# Patient Record
Sex: MALE | Race: WHITE | URBAN - METROPOLITAN AREA
[De-identification: names, ages, dates, MRNs, and addresses within clinical notes are randomized per-mention and may not be internally consistent; named-entity substitution may affect disease eponyms.]

---

## 2017-01-28 ENCOUNTER — OFFICE VISIT (OUTPATIENT)
Dept: URGENT CARE | Facility: RETAIL CLINIC | Age: 57
End: 2017-01-28
Payer: COMMERCIAL

## 2017-01-28 VITALS — TEMPERATURE: 98.1 F | HEART RATE: 67 BPM | SYSTOLIC BLOOD PRESSURE: 123 MMHG | DIASTOLIC BLOOD PRESSURE: 77 MMHG

## 2017-01-28 DIAGNOSIS — J01.90 ACUTE SINUSITIS WITH SYMPTOMS > 10 DAYS: Primary | ICD-10-CM

## 2017-01-28 PROCEDURE — 99213 OFFICE O/P EST LOW 20 MIN: CPT | Performed by: PHYSICIAN ASSISTANT

## 2017-01-28 RX ORDER — PREDNISONE 20 MG/1
40 TABLET ORAL DAILY
Qty: 10 TABLET | Refills: 0 | Status: SHIPPED | OUTPATIENT
Start: 2017-01-28 | End: 2017-02-02

## 2017-01-28 NOTE — MR AVS SNAPSHOT
"              After Visit Summary   1/28/2017    Chriss Valenzuela    MRN: [id number]           Patient Information     Date Of Birth          1960        Visit Information        Provider Department      1/28/2017 9:00 AM Reina Owusu PA-C [facility]        Today's Diagnoses     Acute sinusitis with symptoms > 10 days    -  1       Care Instructions    Augmentin 875 twice daily for 10 days.  Prednisone 40mg daily for 5 days in the morning with food.  Flonase 2 sprays in each nostril daily- may do 1 spray in AM and 1 spray in PM  Afrin (oxymetazoline) nasal spray twice daily for 3 days. Stop after 3 days.  Sudafed behind the pharmacist counter for 3-5 days helps relieve congestion  Take Tylenol or an NSAID such as ibuprofen or naproxen as needed for pain.  May use netti pot with bottled or distilled water and saline packets to flush sinuses.  Sit in the bathroom with the door closed and hot shower running to loosen mucus.  Contact primary care clinic if you do not have any relief from your symptoms after 10 days.  Present to emergency room for significantly increasing pain, persistent high fever >102F, swelling/redness around your eyes, changes in your vision or ability to move your eyes, altered mental status or a severe headache.    Call [facility] to set up an appointment with ENT:  Dr. Casanova        Follow-ups after your visit        Who to contact     You can reach your care team any time of the day by calling [phone].  Notification of test results:  If you have an abnormal lab result, we will notify you by phone as soon as possible.         Additional Information About Your Visit        [employer] Information     [employer] lets you send messages to your doctor, view your test results, renew your prescriptions, schedule appointments and more. To sign up, go to [email or web address] . Click on \"Log in\" on the left side of the screen, which will take you to the Welcome " "page. Then click on \"Sign up Now\" on the right side of the page.     You will be asked to enter the access code listed below, as well as some personal information. Please follow the directions to create your username and password.     Your access code is: [email or web address]  Expires: 2017  9:22 AM     Your access code will  in 90 days. If you need help or a new code, please call your [facility] clinic or [phone].        Care EveryWhere ID     This is your Care EveryWhere ID. This could be used by other organizations to access your [facility] medical records  [id number]        Your Vitals Were     Pulse Temperature                67 98.1  F (36.7  C) (Oral)           Blood Pressure from Last 3 Encounters:   17 123/77   16 114/70   01/04/15 116/78    Weight from Last 3 Encounters:   13 209 lb (94.802 kg)   11 217 lb (98.431 kg)              Today, you had the following     No orders found for display         Today's Medication Changes          These changes are accurate as of: 17  9:22 AM.  If you have any questions, ask your nurse or doctor.               Start taking these medicines.        Dose/Directions    amoxicillin-clavulanate 875-125 MG per tablet   Commonly known as:  AUGMENTIN   Used for:  Acute sinusitis with symptoms > 10 days        Dose:  1 tablet   Take 1 tablet by mouth 2 times daily for 10 days   Quantity:  20 tablet   Refills:  0       predniSONE 20 MG tablet   Commonly known as:  DELTASONE   Used for:  Acute sinusitis with symptoms > 10 days        Dose:  40 mg   Take 2 tablets (40 mg) by mouth daily for 5 days   Quantity:  10 tablet   Refills:  0            Where to get your medicines      These medications were sent to [id number] - [place], [place] - [address] [address] [facility]  [address] [facility] [address] [facility] [address]     Phone:  [phone]    - amoxicillin-clavulanate 875-125 MG per tablet  - predniSONE 20 MG tablet             Primary Care Provider Office Phone " # Fax #    Emerson Fatima [phone]       [address] [address]        Thank you!     Thank you for choosing [facility]  for your care. Our goal is always to provide you with excellent care. Hearing back from our patients is one way we can continue to improve our services. Please take a few minutes to complete the written survey that you may receive in the mail after your visit with us. Thank you!             Your Updated Medication List - Protect others around you: Learn how to safely use, store and throw away your medicines at www.disposemymeds.org.          This list is accurate as of: 1/28/17  9:22 AM.  Always use your most recent med list.                   Brand Name Dispense Instructions for use    amoxicillin-clavulanate 875-125 MG per tablet    AUGMENTIN    20 tablet    Take 1 tablet by mouth 2 times daily for 10 days       fluticasone 50 MCG/ACT spray    FLONASE    1 Package    1-2 sprays by Both Nostrils route daily.       IBUPROFEN PO          levothyroxine 25 MCG tablet    SYNTHROID/LEVOTHROID     Take 25 mcg by mouth daily.       MUCINEX D PO      Take 1 tablet by mouth daily.       predniSONE 20 MG tablet    DELTASONE    10 tablet    Take 2 tablets (40 mg) by mouth daily for 5 days       SUDAFED PO

## 2017-01-28 NOTE — PATIENT INSTRUCTIONS
Augmentin 875 twice daily for 10 days.  Prednisone 40mg daily for 5 days in the morning with food.  Flonase 2 sprays in each nostril daily- may do 1 spray in AM and 1 spray in PM  Afrin (oxymetazoline) nasal spray twice daily for 3 days. Stop after 3 days.  Sudafed behind the pharmacist counter for 3-5 days helps relieve congestion  Take Tylenol or an NSAID such as ibuprofen or naproxen as needed for pain.  May use netti pot with bottled or distilled water and saline packets to flush sinuses.  Sit in the bathroom with the door closed and hot shower running to loosen mucus.  Contact primary care clinic if you do not have any relief from your symptoms after 10 days.  Present to emergency room for significantly increasing pain, persistent high fever >102F, swelling/redness around your eyes, changes in your vision or ability to move your eyes, altered mental status or a severe headache.    Call [facility] to set up an appointment with ENT:  Dr. Casanova

## 2017-01-28 NOTE — PROGRESS NOTES
Chief Complaint   Patient presents with     Sinus Problem     x 3 months, no fevers, sinus pressure and pain, green mucus, ears feel plugged     SUBJECTIVE:  Chriss Valenzuela is a 57 year old male here with concerns about sinus infection.  He states onset of symptoms was 3 months ago. He is a  and thinks saw dust irritates his sinuses.  Course of illness is worsening in the last 7-10 days.   Severity moderate  He has had maxillary pressure as well as nasal congestion, facial pain/pressure, headache and ear fullness.  Predisposing factors include none.   Recent treatment has included: Decongestants and Steroid nasal spray. Advil 4 hours ago    Past Medical History   Diagnosis Date     Hypothyroid      GERD (gastroesophageal reflux disease)      Current Outpatient Prescriptions   Medication Sig Dispense Refill     Pseudoephedrine HCl (SUDAFED PO)        IBUPROFEN PO        amoxicillin-clavulanate (AUGMENTIN) 875-125 MG per tablet Take 1 tablet by mouth 2 times daily for 10 days 20 tablet 0     predniSONE (DELTASONE) 20 MG tablet Take 2 tablets (40 mg) by mouth daily for 5 days 10 tablet 0     fluticasone (FLONASE) 50 MCG/ACT nasal spray 1-2 sprays by Both Nostrils route daily. 1 Package 1     Pseudoephedrine-Guaifenesin (MUCINEX D PO) Take 1 tablet by mouth daily.       levothyroxine (SYNTHROID, LEVOTHROID) 25 MCG tablet Take 25 mcg by mouth daily.       Social History   Substance Use Topics     Smoking status: Never Smoker      Smokeless tobacco: Never Used     Alcohol Use: Not on file     No Known Allergies     ROS:  Review of systems negative except as stated above.    OBJECTIVE:  /77 mmHg  Pulse 67  Temp(Src) 98.1  F (36.7  C) (Oral)  GENERAL APPEARANCE: healthy, alert and no distress  EYES: PERRL, conjunctiva clear  HENT: Pain with palpation over frontal and maxillary sinuses. Ear canals normal TMs with mild serous effusions bilaterally. Nasal turbinates edematous and boggy with a blue hue  bilaterally. Posterior pharynx is not erythematous.  NECK: supple, nontender, no lymphadenopathy  RESP: lungs clear to auscultation - no rales, rhonchi or wheezes  CV: regular rates and rhythm, normal S1 S2, no murmur noted    ASSESSMENT:    ICD-10-CM    1. Acute sinusitis with symptoms > 10 days J01.90 amoxicillin-clavulanate (AUGMENTIN) 875-125 MG per tablet     predniSONE (DELTASONE) 20 MG tablet     PLAN:   Patient Instructions   Augmentin 875 twice daily for 10 days.  Prednisone 40mg daily for 5 days in the morning with food.  Flonase 2 sprays in each nostril daily- may do 1 spray in AM and 1 spray in PM  Afrin (oxymetazoline) nasal spray twice daily for 3 days. Stop after 3 days.  Sudafed behind the pharmacist counter for 3-5 days helps relieve congestion  Take Tylenol or an NSAID such as ibuprofen or naproxen as needed for pain.  May use netti pot with bottled or distilled water and saline packets to flush sinuses.  Sit in the bathroom with the door closed and hot shower running to loosen mucus.  Contact primary care clinic if you do not have any relief from your symptoms after 10 days.  Present to emergency room for significantly increasing pain, persistent high fever >102F, swelling/redness around your eyes, changes in your vision or ability to move your eyes, altered mental status or a severe headache.    Call [facility] to set up an appointment with ENT:  Dr. Casanova    Follow up with primary care provider with any problems, questions or concerns or if symptoms worsen or fail to improve. Patient agreed to plan and verbalized understanding.    Deneen Owusu PA-C  [facility]

## 2018-07-06 ENCOUNTER — OFFICE VISIT (OUTPATIENT)
Dept: URGENT CARE | Facility: RETAIL CLINIC | Age: 58
End: 2018-07-06
Payer: COMMERCIAL

## 2018-07-06 VITALS
DIASTOLIC BLOOD PRESSURE: 79 MMHG | OXYGEN SATURATION: 98 % | HEART RATE: 63 BPM | TEMPERATURE: 98 F | SYSTOLIC BLOOD PRESSURE: 125 MMHG

## 2018-07-06 DIAGNOSIS — J01.90 ACUTE SINUSITIS WITH SYMPTOMS > 10 DAYS: Primary | ICD-10-CM

## 2018-07-06 PROCEDURE — 99213 OFFICE O/P EST LOW 20 MIN: CPT | Performed by: FAMILY MEDICINE

## 2018-07-06 RX ORDER — CEPHALEXIN 500 MG/1
500 CAPSULE ORAL 3 TIMES DAILY
Qty: 30 CAPSULE | Refills: 0 | Status: SHIPPED | OUTPATIENT
Start: 2018-07-06

## 2018-07-06 NOTE — MR AVS SNAPSHOT
"              After Visit Summary   2018    Chriss Valenzuela    MRN: [id number]           Patient Information     Date Of Birth          1960        Visit Information        Provider Department      2018 4:00 PM Anjum Farrell MD [facility]        Today's Diagnoses     Acute sinusitis with symptoms > 10 days    -  1       Follow-ups after your visit        Who to contact     You can reach your care team any time of the day by calling [phone].  Notification of test results:  If you have an abnormal lab result, we will notify you by phone as soon as possible.         Additional Information About Your Visit        MyChart Information     [employer] lets you send messages to your doctor, view your test results, renew your prescriptions, schedule appointments and more. To sign up, go to [email or web address] . Click on \"Log in\" on the left side of the screen, which will take you to the Welcome page. Then click on \"Sign up Now\" on the right side of the page.     You will be asked to enter the access code listed below, as well as some personal information. Please follow the directions to create your username and password.     Your access code is: [email or web address]  Expires: 10/4/2018  4:23 PM     Your access code will  in 90 days. If you need help or a new code, please call your [facility] clinic or [phone].        Care EveryWhere ID     This is your Care EveryWhere ID. This could be used by other organizations to access your [facility] medical records  [id number]        Your Vitals Were     Pulse Temperature Pulse Oximetry             63 98  F (36.7  C) (Temporal) 98%          Blood Pressure from Last 3 Encounters:   18 125/79   17 123/77   16 114/70    Weight from Last 3 Encounters:   13 209 lb (94.8 kg)   11 217 lb (98.4 kg)              Today, you had the following     No orders found for display         Today's Medication Changes          These " changes are accurate as of 7/6/18  4:23 PM.  If you have any questions, ask your nurse or doctor.               Start taking these medicines.        Dose/Directions    cephALEXin 500 MG capsule   Commonly known as:  KEFLEX   Used for:  Acute sinusitis with symptoms > 10 days   Started by:  Anjum Farrell MD        Dose:  500 mg   Take 1 capsule (500 mg) by mouth 3 times daily   Quantity:  30 capsule   Refills:  0            Where to get your medicines      These medications were sent to [id number] - [place], [place] - [address]  [address], [facility] [address]     Phone:  [phone]     cephALEXin 500 MG capsule                Primary Care Provider Office Phone # Fax #    Emerson Fatima [phone]       [address] [address]        Equal Access to Services     [facility]: Hadii gordon chau hadasho Soomaali, waaxda luqadaha, qaybta kaalmada adeegyada, ramez boss . So [facility] [phone].    ATENCIÓN: Si habla español, tiene a beckwith disposición servicios gratuitos de asistencia lingüística. [facility] [phone].    We comply with applicable federal civil rights laws and [place] laws. We do not discriminate on the basis of race, color, national origin, age, disability, sex, sexual orientation, or gender identity.            Thank you!     Thank you for choosing [facility]  for your care. Our goal is always to provide you with excellent care. Hearing back from our patients is one way we can continue to improve our services. Please take a few minutes to complete the written survey that you may receive in the mail after your visit with us. Thank you!             Your Updated Medication List - Protect others around you: Learn how to safely use, store and throw away your medicines at www.disposemymeds.org.          This list is accurate as of 7/6/18  4:23 PM.  Always use your most recent med list.                    Brand Name Dispense Instructions for use Diagnosis    cephALEXin 500 MG capsule    KEFLEX    30 capsule    Take 1 capsule (500 mg) by mouth 3 times daily    Acute sinusitis with symptoms > 10 days       fluticasone 50 MCG/ACT spray    FLONASE    1 Package    1-2 sprays by Both Nostrils route daily.    Sinusitis       IBUPROFEN PO           levothyroxine 25 MCG tablet    SYNTHROID/LEVOTHROID     Take 25 mcg by mouth daily.        MUCINEX D PO      Take 1 tablet by mouth daily.        SUDAFED PO

## 2018-07-06 NOTE — PROGRESS NOTES
SUBJECTIVE:  Chriss Valenzuela is a 58 year old male here with concerns about sinus infection.  He states onset of symptoms were 6 day(s) ago.  He has had maxillary, frontal pressure. Course of illness is worsening. Severity moderately severe  Current and Associated symptoms: nasal congestion, headache and post-nasal drainage  Predisposing factors include HX of recurrent sinusitis. Recent treatment has included: Steroid nasal spray and saline    Past Medical History:   Diagnosis Date     GERD (gastroesophageal reflux disease)      Hypothyroid      Current Outpatient Prescriptions   Medication Sig Dispense Refill     cephALEXin (KEFLEX) 500 MG capsule Take 1 capsule (500 mg) by mouth 3 times daily 30 capsule 0     fluticasone (FLONASE) 50 MCG/ACT nasal spray 1-2 sprays by Both Nostrils route daily. (Patient not taking: Reported on 7/6/2018) 1 Package 1     IBUPROFEN PO        levothyroxine (SYNTHROID, LEVOTHROID) 25 MCG tablet Take 25 mcg by mouth daily.       Pseudoephedrine HCl (SUDAFED PO)        Pseudoephedrine-Guaifenesin (MUCINEX D PO) Take 1 tablet by mouth daily.       History   Smoking Status     Never Smoker   Smokeless Tobacco     Never Used       ROS:  Review of systems negative except as stated above.    OBJECTIVE:  /79 (Cuff Size: Adult Regular)  Pulse 63  Temp 98  F (36.7  C) (Temporal)  SpO2 98%  Exam:GENERAL APPEARANCE: healthy, alert and no distress  EYES: EOMI,  PERRL, conjunctiva clear  HENT: nasal turbinates erythematous, swollen  NECK: supple, nontender, no lymphadenopathy  RESP: lungs clear to auscultation - no rales, rhonchi or wheezes  CV: regular rates and rhythm, normal S1 S2, no murmur noted  ABDOMEN:  soft, nontender, no HSM or masses and bowel sounds normal  NEURO: Normal strength and tone, sensory exam grossly normal,  normal speech and mentation  SKIN: no suspicious lesions or rashes    ASSESSMENT:  Sinusitis    PLAN: Fluids, rest, continue Flonase, add Cephalexin  No orders of  the defined types were placed in this encounter.    Follow up with primary care provider if not improving.

## 2021-08-07 ENCOUNTER — HEALTH MAINTENANCE LETTER (OUTPATIENT)
Age: 61
End: 2021-08-07

## 2021-10-02 ENCOUNTER — HEALTH MAINTENANCE LETTER (OUTPATIENT)
Age: 61
End: 2021-10-02

## 2022-09-03 ENCOUNTER — HEALTH MAINTENANCE LETTER (OUTPATIENT)
Age: 62
End: 2022-09-03

## 2023-09-30 ENCOUNTER — HEALTH MAINTENANCE LETTER (OUTPATIENT)
Age: 63
End: 2023-09-30